# Patient Record
Sex: FEMALE | Race: WHITE
[De-identification: names, ages, dates, MRNs, and addresses within clinical notes are randomized per-mention and may not be internally consistent; named-entity substitution may affect disease eponyms.]

---

## 2020-11-05 ENCOUNTER — HOSPITAL ENCOUNTER (OUTPATIENT)
Dept: HOSPITAL 72 - PAN | Age: 55
Discharge: HOME | End: 2020-11-05
Payer: MEDICAID

## 2020-11-05 VITALS — SYSTOLIC BLOOD PRESSURE: 122 MMHG | DIASTOLIC BLOOD PRESSURE: 78 MMHG

## 2020-11-05 DIAGNOSIS — Z88.0: ICD-10-CM

## 2020-11-05 DIAGNOSIS — Z88.2: ICD-10-CM

## 2020-11-05 DIAGNOSIS — Z00.01: Primary | ICD-10-CM

## 2020-11-05 DIAGNOSIS — B96.81: ICD-10-CM

## 2020-11-05 DIAGNOSIS — Z88.8: ICD-10-CM

## 2020-11-05 PROCEDURE — 99212 OFFICE O/P EST SF 10 MIN: CPT

## 2020-11-05 NOTE — GENERAL PROGRESS NOTE
Subjective


ROS Limited/Unobtainable:  Yes


Allergies:  


Coded Allergies:  


     PENICILLINS (Verified  Allergy, Severe, Rash, 9/25/20)


   BREATHING ISSUE


     CEPHALEXIN (Verified  Allergy, Intermediate, 9/25/20)


   SWOLLEN


     SULFAMETHOXAZOLE (Verified  Allergy, Intermediate, 9/25/20)


   SWOLLEN


     TRIMETHOPRIM (Verified  Allergy, Intermediate, 9/25/20)


   SWOLLEN





Objective





Last 24 Hour Vital Signs








  Date Time  Temp Pulse Resp B/P (MAP) Pulse Ox O2 Delivery O2 Flow Rate FiO2


 


11/5/20 13:54 97.7 73 16 122/78 98   








General Appearance:  alert


EENT:  normal ENT inspection


Neck:  supple


Cardiovascular:  normal rate


Respiratory/Chest:  lungs clear


Abdomen:  normal bowel sounds, non tender, soft


Extremities:  non-tender





Assessment/Plan


Assessment/Plan:


s/p EGD and colonoscopy


HP positive


treat with Quatro theraphy


repeat colon in 5 years











Justino Mendoza MD              Nov 5, 2020 16:05

## 2021-02-08 ENCOUNTER — HOSPITAL ENCOUNTER (OUTPATIENT)
Dept: HOSPITAL 72 - PAN | Age: 56
Discharge: HOME | End: 2021-02-08
Payer: MEDICAID

## 2021-02-08 DIAGNOSIS — B96.81: ICD-10-CM

## 2021-02-08 DIAGNOSIS — R10.9: Primary | ICD-10-CM

## 2021-02-08 DIAGNOSIS — Z88.2: ICD-10-CM

## 2021-02-08 PROCEDURE — 99212 OFFICE O/P EST SF 10 MIN: CPT

## 2021-02-09 VITALS — DIASTOLIC BLOOD PRESSURE: 69 MMHG | SYSTOLIC BLOOD PRESSURE: 110 MMHG

## 2021-02-11 NOTE — GENERAL PROGRESS NOTE
Subjective


ROS Limited/Unobtainable:  Yes


Allergies:  


Coded Allergies:  


     PENICILLINS (Verified  Allergy, Severe, Rash, 9/25/20)


   BREATHING ISSUE


     CEPHALEXIN (Verified  Allergy, Intermediate, 9/25/20)


   SWOLLEN


     SULFAMETHOXAZOLE (Verified  Allergy, Intermediate, 9/25/20)


   SWOLLEN


     TRIMETHOPRIM (Verified  Allergy, Intermediate, 9/25/20)


   SWOLLEN





Objective


General Appearance:  alert


EENT:  PERRL/EOMI


Neck:  supple


Cardiovascular:  normal rate


Respiratory/Chest:  decreased breath sounds


Abdomen:  normal bowel sounds, non tender, soft


Extremities:  non-tender





Assessment/Plan


Assessment/Plan:


Assessment/Plan


Assessment/Plan:


s/p EGD and colonoscopy


HP positive


s/p Quatro theraphy


ordered BT


repeat colon in 5 years











Justino Mendoza MD             Feb 11, 2021 14:43